# Patient Record
Sex: FEMALE | Race: WHITE | NOT HISPANIC OR LATINO | URBAN - METROPOLITAN AREA
[De-identification: names, ages, dates, MRNs, and addresses within clinical notes are randomized per-mention and may not be internally consistent; named-entity substitution may affect disease eponyms.]

---

## 2021-05-04 ENCOUNTER — PREPPED CHART (OUTPATIENT)
Dept: URBAN - METROPOLITAN AREA CLINIC 66 | Facility: CLINIC | Age: 71
End: 2021-05-04

## 2021-05-04 PROBLEM — H04.123 DRY EYE SYNDROME: Status: STABILIZING | Noted: 2021-05-04

## 2021-05-04 PROBLEM — G51.0 BELL'S PALSY: Status: STABILIZING | Noted: 2021-05-04

## 2021-05-04 PROBLEM — H35.373 EPIRETINAL MEMBRANE: Status: STABILIZING | Noted: 2021-05-04

## 2021-05-04 PROBLEM — H04.123 DRY EYE SYNDROME: Noted: 2021-05-04

## 2021-05-04 PROBLEM — G51.0 BELL'S PALSY: Noted: 2021-05-04

## 2021-05-04 PROBLEM — H35.373 EPIRETINAL MEMBRANE: Noted: 2021-05-04

## 2021-05-04 PROBLEM — H25.13 NS CATARACT: Noted: 2021-05-04

## 2022-05-02 ASSESSMENT — TONOMETRY
OS_IOP_MMHG: 15
OD_IOP_MMHG: 19

## 2022-05-02 ASSESSMENT — VISUAL ACUITY
OD_SC: 20/20
OS_SC: 20/40

## 2022-05-11 ENCOUNTER — COMPLETE EYE EXAM (OUTPATIENT)
Dept: URBAN - METROPOLITAN AREA CLINIC 66 | Facility: CLINIC | Age: 72
End: 2022-05-11

## 2022-05-11 DIAGNOSIS — H04.123: ICD-10-CM

## 2022-05-11 DIAGNOSIS — G51.0: ICD-10-CM

## 2022-05-11 DIAGNOSIS — H35.373: ICD-10-CM

## 2022-05-11 PROCEDURE — 92014 COMPRE OPH EXAM EST PT 1/>: CPT

## 2022-05-11 PROCEDURE — 92134 CPTRZ OPH DX IMG PST SGM RTA: CPT

## 2022-05-11 PROCEDURE — 92202 OPSCPY EXTND ON/MAC DRAW: CPT

## 2022-05-11 ASSESSMENT — TONOMETRY
OD_IOP_MMHG: 15
OS_IOP_MMHG: 12

## 2022-05-11 ASSESSMENT — VISUAL ACUITY
OD_SC: 20/20
OS_SC: 20/25

## 2022-06-30 ENCOUNTER — OFFICE (OUTPATIENT)
Dept: URBAN - METROPOLITAN AREA CLINIC 79 | Facility: CLINIC | Age: 72
End: 2022-06-30
Payer: COMMERCIAL

## 2022-06-30 VITALS
SYSTOLIC BLOOD PRESSURE: 136 MMHG | HEART RATE: 54 BPM | HEIGHT: 63 IN | TEMPERATURE: 96.2 F | DIASTOLIC BLOOD PRESSURE: 67 MMHG | WEIGHT: 180 LBS

## 2022-06-30 DIAGNOSIS — R19.5 OTHER FECAL ABNORMALITIES: ICD-10-CM

## 2022-06-30 DIAGNOSIS — Z86.010 PERSONAL HISTORY OF COLONIC POLYPS: ICD-10-CM

## 2022-06-30 DIAGNOSIS — S30.817A ABRASION OF ANUS, INITIAL ENCOUNTER: ICD-10-CM

## 2022-06-30 PROCEDURE — 99204 OFFICE O/P NEW MOD 45 MIN: CPT

## 2022-06-30 NOTE — SERVICEHPINOTES
70 y/o female presents for evaluation of anal discomfort. Patient reports this is an intermittent issue for several weeks. Describes a burning sensation around anus. Explains there is no pain with defecation or spasm-like sensation. Discomfort is there after BMs. States more noticeable when having loose stools. She notes having hand tremors and sometimes not being able to clean well after BM. Her bowel habits are regular overall, stools formed but has periods of "diarrhea" usually limited to 1 episode/day. There is no blood in stool or dark stools. No abdominal pain, fevers, chills, night sweats, unintentional weight loss. Colonoscopy 1/2014: moderate diverticulosis, internal hemorrhoids, sessile serrated polyp x1. R/c 5 yrs.br visited="true"

## 2022-08-08 ENCOUNTER — OFFICE (OUTPATIENT)
Dept: URBAN - METROPOLITAN AREA CLINIC 79 | Facility: CLINIC | Age: 72
End: 2022-08-08

## 2022-08-08 PROCEDURE — 00031: CPT | Performed by: INTERNAL MEDICINE

## 2022-08-17 ENCOUNTER — AMBULATORY SURGICAL CENTER (OUTPATIENT)
Dept: URBAN - METROPOLITAN AREA SURGERY 23 | Facility: SURGERY | Age: 72
End: 2022-08-17
Payer: COMMERCIAL

## 2022-08-17 DIAGNOSIS — Z86.010 PERSONAL HISTORY OF COLONIC POLYPS: ICD-10-CM

## 2022-08-17 DIAGNOSIS — D12.0 BENIGN NEOPLASM OF CECUM: ICD-10-CM

## 2022-08-17 DIAGNOSIS — D12.6 BENIGN NEOPLASM OF COLON, UNSPECIFIED: ICD-10-CM

## 2022-08-17 PROCEDURE — 45385 COLONOSCOPY W/LESION REMOVAL: CPT | Performed by: INTERNAL MEDICINE

## 2023-05-10 ENCOUNTER — FOLLOW UP (OUTPATIENT)
Dept: URBAN - METROPOLITAN AREA CLINIC 66 | Facility: CLINIC | Age: 73
End: 2023-05-10

## 2023-05-10 DIAGNOSIS — G51.0: ICD-10-CM

## 2023-05-10 DIAGNOSIS — H35.373: ICD-10-CM

## 2023-05-10 DIAGNOSIS — H43.813: ICD-10-CM

## 2023-05-10 DIAGNOSIS — H04.123: ICD-10-CM

## 2023-05-10 DIAGNOSIS — H35.363: ICD-10-CM

## 2023-05-10 DIAGNOSIS — H35.61: ICD-10-CM

## 2023-05-10 PROCEDURE — 92014 COMPRE OPH EXAM EST PT 1/>: CPT

## 2023-05-10 PROCEDURE — 92134 CPTRZ OPH DX IMG PST SGM RTA: CPT

## 2023-05-10 PROCEDURE — 92202 OPSCPY EXTND ON/MAC DRAW: CPT

## 2023-05-10 ASSESSMENT — VISUAL ACUITY
OS_SC: 20/25
OD_PH: 20/25-2
OD_SC: 20/30-1

## 2023-05-10 ASSESSMENT — TONOMETRY
OD_IOP_MMHG: 14
OS_IOP_MMHG: 13